# Patient Record
Sex: MALE | Race: OTHER
[De-identification: names, ages, dates, MRNs, and addresses within clinical notes are randomized per-mention and may not be internally consistent; named-entity substitution may affect disease eponyms.]

---

## 2021-07-05 ENCOUNTER — HOSPITAL ENCOUNTER (EMERGENCY)
Dept: HOSPITAL 54 - ER | Age: 46
Discharge: HOME | End: 2021-07-05
Payer: SELF-PAY

## 2021-07-05 VITALS — BODY MASS INDEX: 24.11 KG/M2 | WEIGHT: 150 LBS | HEIGHT: 66 IN

## 2021-07-05 VITALS — DIASTOLIC BLOOD PRESSURE: 80 MMHG | SYSTOLIC BLOOD PRESSURE: 137 MMHG
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
## 2021-07-05 DIAGNOSIS — F10.129: ICD-10-CM
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
## 2021-07-05 DIAGNOSIS — F41.9: ICD-10-CM
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
## 2021-07-05 DIAGNOSIS — S09.8XXA: ICD-10-CM
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
## 2021-07-05 DIAGNOSIS — S01.311A: Primary | ICD-10-CM

## 2021-07-05 PROCEDURE — 90471 IMMUNIZATION ADMIN: CPT

## 2021-07-05 PROCEDURE — 12011 RPR F/E/E/N/L/M 2.5 CM/<: CPT

## 2021-07-05 PROCEDURE — 90715 TDAP VACCINE 7 YRS/> IM: CPT

## 2021-07-05 PROCEDURE — 99284 EMERGENCY DEPT VISIT MOD MDM: CPT

## 2021-07-05 PROCEDURE — 70450 CT HEAD/BRAIN W/O DYE: CPT
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
